# Patient Record
Sex: MALE | Race: WHITE | URBAN - METROPOLITAN AREA
[De-identification: names, ages, dates, MRNs, and addresses within clinical notes are randomized per-mention and may not be internally consistent; named-entity substitution may affect disease eponyms.]

---

## 2019-01-21 ENCOUNTER — OFFICE VISIT (OUTPATIENT)
Dept: URBAN - METROPOLITAN AREA CLINIC 17 | Facility: CLINIC | Age: 69
End: 2019-01-21
Payer: MEDICARE

## 2019-01-21 DIAGNOSIS — H26.491 OTHER SECONDARY CATARACT, RIGHT EYE: Primary | ICD-10-CM

## 2019-01-21 PROCEDURE — 99213 OFFICE O/P EST LOW 20 MIN: CPT | Performed by: OPHTHALMOLOGY

## 2019-01-21 ASSESSMENT — KERATOMETRY
OS: 43.38
OD: 42.88

## 2019-01-21 ASSESSMENT — INTRAOCULAR PRESSURE
OD: 12
OS: 14

## 2019-01-21 ASSESSMENT — VISUAL ACUITY
OD: 20/25
OS: 20/20

## 2019-01-21 NOTE — IMPRESSION/PLAN
Impression: Other secondary cataract, right eye: H26.491. Condition: established, worsening. Symptoms: could improve with surgery. Vision: vision affected. Plan: Discussed diagnosis with patient. Recommend YAG OD laser treatment. Patient understands and wishes to proceed.

## 2019-01-21 NOTE — IMPRESSION/PLAN
Impression: Combined forms of age-related cataract, left eye: H25.812. Plan: Cataracts account for the patient's complaints. No treatment currently recommended. The patient will monitor vision changes and contact us with any decrease in vision.

## 2019-02-14 ENCOUNTER — SURGERY (OUTPATIENT)
Dept: URBAN - METROPOLITAN AREA SURGERY 7 | Facility: SURGERY | Age: 69
End: 2019-02-14
Payer: MEDICARE

## 2019-02-14 PROCEDURE — 66821 AFTER CATARACT LASER SURGERY: CPT | Performed by: OPHTHALMOLOGY

## 2019-02-25 ENCOUNTER — POST-OPERATIVE VISIT (OUTPATIENT)
Dept: URBAN - METROPOLITAN AREA CLINIC 18 | Facility: CLINIC | Age: 69
End: 2019-02-25

## 2019-02-25 DIAGNOSIS — Z09 ENCNTR FOR F/U EXAM AFT TRTMT FOR COND OTH THAN MALIG NEOPLM: Primary | ICD-10-CM

## 2019-02-25 PROCEDURE — 99024 POSTOP FOLLOW-UP VISIT: CPT | Performed by: OPTOMETRIST

## 2019-02-25 ASSESSMENT — INTRAOCULAR PRESSURE
OD: 12
OS: 11

## 2019-02-25 ASSESSMENT — VISUAL ACUITY: OD: 20/20-

## 2019-06-03 ENCOUNTER — OFFICE VISIT (OUTPATIENT)
Dept: URBAN - METROPOLITAN AREA CLINIC 18 | Facility: CLINIC | Age: 69
End: 2019-06-03
Payer: MEDICARE

## 2019-06-03 PROCEDURE — 92012 INTRM OPH EXAM EST PATIENT: CPT | Performed by: OPTOMETRIST

## 2019-06-03 ASSESSMENT — KERATOMETRY
OD: 42.63
OS: 43.13

## 2019-06-03 ASSESSMENT — INTRAOCULAR PRESSURE
OS: 12
OD: 11

## 2020-11-30 ENCOUNTER — OFFICE VISIT (OUTPATIENT)
Dept: URBAN - METROPOLITAN AREA CLINIC 18 | Facility: CLINIC | Age: 70
End: 2020-11-30
Payer: MEDICARE

## 2020-11-30 DIAGNOSIS — Z96.1 PRESENCE OF PSEUDOPHAKIA: ICD-10-CM

## 2020-11-30 PROCEDURE — 92014 COMPRE OPH EXAM EST PT 1/>: CPT | Performed by: OPTOMETRIST

## 2020-11-30 ASSESSMENT — INTRAOCULAR PRESSURE
OS: 10
OD: 9

## 2021-01-25 ENCOUNTER — OFFICE VISIT (OUTPATIENT)
Dept: URBAN - METROPOLITAN AREA CLINIC 18 | Facility: CLINIC | Age: 71
End: 2021-01-25
Payer: COMMERCIAL

## 2021-01-25 DIAGNOSIS — H52.03 HYPERMETROPIA, BILATERAL: Primary | ICD-10-CM

## 2021-01-25 PROCEDURE — 92012 INTRM OPH EXAM EST PATIENT: CPT | Performed by: OPTOMETRIST

## 2021-01-25 ASSESSMENT — VISUAL ACUITY
OS: 20/20
OD: 20/20

## 2021-01-25 ASSESSMENT — KERATOMETRY
OS: 41.00
OD: 40.75

## 2021-01-25 ASSESSMENT — INTRAOCULAR PRESSURE
OD: 12
OS: 12

## 2021-11-29 ENCOUNTER — OFFICE VISIT (OUTPATIENT)
Dept: URBAN - METROPOLITAN AREA CLINIC 18 | Facility: CLINIC | Age: 71
End: 2021-11-29
Payer: MEDICARE

## 2021-11-29 DIAGNOSIS — H25.812 COMBINED FORMS OF AGE-RELATED CATARACT, LEFT EYE: Primary | ICD-10-CM

## 2021-11-29 DIAGNOSIS — H35.363 BILATERAL DRUSEN OF MACULA: ICD-10-CM

## 2021-11-29 DIAGNOSIS — H43.813 VITREOUS DEGENERATION, BILATERAL: ICD-10-CM

## 2021-11-29 PROCEDURE — 99213 OFFICE O/P EST LOW 20 MIN: CPT | Performed by: OPTOMETRIST

## 2021-11-29 ASSESSMENT — INTRAOCULAR PRESSURE
OS: 13
OD: 13

## 2021-11-29 NOTE — IMPRESSION/PLAN
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 Plan: Discussed diagnosis in detail with patient. Recommend AREDS2 twice daily, wear UV protective sunglasses when outdoors. Will continue to observe condition and or symptoms.

## 2021-12-06 ENCOUNTER — OFFICE VISIT (OUTPATIENT)
Dept: URBAN - METROPOLITAN AREA CLINIC 18 | Facility: CLINIC | Age: 71
End: 2021-12-06
Payer: COMMERCIAL

## 2021-12-06 PROCEDURE — 92012 INTRM OPH EXAM EST PATIENT: CPT | Performed by: OPTOMETRIST

## 2021-12-06 ASSESSMENT — VISUAL ACUITY
OS: 20/20
OD: 20/20